# Patient Record
Sex: FEMALE | Race: WHITE | NOT HISPANIC OR LATINO | Employment: FULL TIME | URBAN - METROPOLITAN AREA
[De-identification: names, ages, dates, MRNs, and addresses within clinical notes are randomized per-mention and may not be internally consistent; named-entity substitution may affect disease eponyms.]

---

## 2017-01-12 PROBLEM — E11.8 TYPE 2 DIABETES MELLITUS WITH COMPLICATION, WITHOUT LONG-TERM CURRENT USE OF INSULIN: Status: ACTIVE | Noted: 2017-01-12

## 2017-01-23 PROBLEM — D50.9 MICROCYTIC ANEMIA: Status: ACTIVE | Noted: 2017-01-23

## 2017-01-23 PROBLEM — R73.9 HYPERGLYCEMIA: Status: ACTIVE | Noted: 2017-01-23

## 2017-01-23 PROBLEM — Z71.89 ENCOUNTER FOR MEDICATION COUNSELING: Status: ACTIVE | Noted: 2017-01-23

## 2017-04-07 PROBLEM — G60.9 IDIOPATHIC PERIPHERAL NEUROPATHY: Status: ACTIVE | Noted: 2017-04-07

## 2022-07-07 DIAGNOSIS — M79.641 RIGHT HAND PAIN: Primary | ICD-10-CM

## 2022-08-26 ENCOUNTER — HOSPITAL ENCOUNTER (OUTPATIENT)
Dept: RADIOLOGY | Facility: HOSPITAL | Age: 52
Discharge: HOME | End: 2022-08-26
Attending: ORTHOPAEDIC SURGERY
Payer: COMMERCIAL

## 2022-08-26 ENCOUNTER — OFFICE VISIT (OUTPATIENT)
Dept: ORTHOPEDICS | Facility: CLINIC | Age: 52
End: 2022-08-26
Payer: COMMERCIAL

## 2022-08-26 VITALS — WEIGHT: 192 LBS | HEIGHT: 71 IN | BODY MASS INDEX: 26.88 KG/M2 | RESPIRATION RATE: 18 BRPM

## 2022-08-26 DIAGNOSIS — M65.341 TRIGGER RING FINGER OF RIGHT HAND: Primary | ICD-10-CM

## 2022-08-26 DIAGNOSIS — M79.641 RIGHT HAND PAIN: ICD-10-CM

## 2022-08-26 DIAGNOSIS — M79.641 RIGHT HAND PAIN: Primary | ICD-10-CM

## 2022-08-26 DIAGNOSIS — M65.351 TRIGGER LITTLE FINGER OF RIGHT HAND: ICD-10-CM

## 2022-08-26 PROCEDURE — 20550 NJX 1 TENDON SHEATH/LIGAMENT: CPT | Mod: F9,59,S$GLB,ICN | Performed by: ORTHOPAEDIC SURGERY

## 2022-08-26 PROCEDURE — 99204 PR OFFICE/OUTPT VISIT, NEW, LEVL IV, 45-59 MIN: ICD-10-PCS | Mod: 25,S$GLB,ICN, | Performed by: ORTHOPAEDIC SURGERY

## 2022-08-26 PROCEDURE — 3008F PR BODY MASS INDEX (BMI) DOCUMENTED: ICD-10-PCS | Mod: S$GLB,ICN,, | Performed by: ORTHOPAEDIC SURGERY

## 2022-08-26 PROCEDURE — 73130 XR HAND COMPLETE 3 VIEW RIGHT: ICD-10-PCS | Mod: 26,RT,, | Performed by: RADIOLOGY

## 2022-08-26 PROCEDURE — 99204 OFFICE O/P NEW MOD 45 MIN: CPT | Mod: 25,S$GLB,ICN, | Performed by: ORTHOPAEDIC SURGERY

## 2022-08-26 PROCEDURE — 3008F BODY MASS INDEX DOCD: CPT | Mod: S$GLB,ICN,, | Performed by: ORTHOPAEDIC SURGERY

## 2022-08-26 PROCEDURE — 99999 PR PBB SHADOW E&M-EST. PATIENT-LVL II: ICD-10-PCS | Mod: PBBFAC,,, | Performed by: ORTHOPAEDIC SURGERY

## 2022-08-26 PROCEDURE — 73130 X-RAY EXAM OF HAND: CPT | Mod: 26,RT,, | Performed by: RADIOLOGY

## 2022-08-26 PROCEDURE — 20550 PR INJECT TENDON SHEATH/LIGAMENT: ICD-10-PCS | Mod: F9,59,S$GLB,ICN | Performed by: ORTHOPAEDIC SURGERY

## 2022-08-26 PROCEDURE — 73130 X-RAY EXAM OF HAND: CPT | Mod: TC,PN,RT

## 2022-08-26 PROCEDURE — 99999 PR PBB SHADOW E&M-EST. PATIENT-LVL II: CPT | Mod: PBBFAC,,, | Performed by: ORTHOPAEDIC SURGERY

## 2022-08-26 PROCEDURE — 1159F PR MEDICATION LIST DOCUMENTED IN MEDICAL RECORD: ICD-10-PCS | Mod: S$GLB,ICN,, | Performed by: ORTHOPAEDIC SURGERY

## 2022-08-26 PROCEDURE — 1159F MED LIST DOCD IN RCRD: CPT | Mod: S$GLB,ICN,, | Performed by: ORTHOPAEDIC SURGERY

## 2022-08-26 PROCEDURE — 20550 NJX 1 TENDON SHEATH/LIGAMENT: CPT | Mod: F7,S$GLB,ICN, | Performed by: ORTHOPAEDIC SURGERY

## 2022-08-26 RX ORDER — IBUPROFEN 800 MG/1
800 TABLET ORAL 3 TIMES DAILY
COMMUNITY
Start: 2022-03-26

## 2022-08-26 NOTE — PROGRESS NOTES
"  Subjective:      Patient ID: Neyda Conner is a 52 y.o. female.    Chief Complaint: Pain, Swelling, and Other of the Right Hand    Referring Provider: Aaarefjj Self  No address on file    HPI:   Ms. Conner is a 52-year-old right-hand-dominant lady who presented today for evaluation of approximately 2 months of right hand pain with primarily her ring and small finger affected.  She says she has pain with burning in her fingers.  Her pain is primarily at the MCP but does radiate upper fingers.  Intermittently her   Ring finger will get stuck.  Gripping things increases her symptoms while rest improves them.  She denied numbness or tingling in her fingers.  She has taken NSAIDs with help.  She has worn a brace which did not help.  She has not done occupational therapy nor had injections.    Past Medical History:   Diagnosis Date    Diabetes mellitus, type 2      *  *  * Gestational diabetes  Hypertension   Seasonal allergies   Headaches      Past Surgical History:   Procedure Laterality Date    BARIATRIC SURGERY  2014   *  BILATERAL BREAST AUGMENTATION    STOMACH SURGERY  07/09/2015    Gastric Sleeve       Review of patient's allergies indicates:   Allergen Reactions    Bactrim [sulfamethoxazole-trimethoprim] Nausea And Vomiting    Lortab [hydrocodone-acetaminophen]      "made my blood pressure go up"       Social History     Occupational History     [employer]   Tobacco Use    Smoking status: Never Smoker    Smokeless tobacco: Not on file   Substance and Sexual Activity    Alcohol use: No    Drug use: No    Sexual activity: Not on file      Family History   Adopted: Yes   Father: Alive, denied medical problems.    Mother: Alive, denied medical problems.    Brother:  2, alive, denied medical problems.    Sister:  2, alive, denied medical problems.    Son:  2, alive, heart surgery/ autism.    Daughter:  1, alive, denied medical problems.       Previous Hospitalizations:   Childbirth.     ROS:   Review of Systems "   Constitutional: Negative for chills and fever.   HENT: Negative for congestion.    Eyes: Negative for blurred vision and double vision.   Cardiovascular: Negative for chest pain and cyanosis.   Respiratory: Negative for cough and shortness of breath.    Endocrine: Negative for cold intolerance and polydipsia.   Hematologic/Lymphatic: Negative for adenopathy.   Skin: Negative for flushing, itching and skin cancer.   Musculoskeletal: Negative for falls and gout.   Gastrointestinal: Negative for constipation, diarrhea and heartburn.   Genitourinary: Negative for nocturia.   Neurological: Positive for headaches. Negative for seizures.   Psychiatric/Behavioral: Negative for depression and substance abuse. The patient is not nervous/anxious.    Allergic/Immunologic: Positive for environmental allergies.           Objective:      Physical Exam:   General: AAOx3.  No acute distress  HEENT: Normocephalic, PEARLA EOMI .  Fair Dentition  Neck: Supple, No JVD  Chest: Symetric, equal excursion on inspiration  Abdomen: Soft NTND  Vascular:  Pulses intact and equal bilaterally.  Capillary refill less than 3 seconds and equal bilaterally  Neurologic:  Pinprick and soft touch intact and equal bilaterally  Integment:  No ecchymosis, no errythema  Extremity:  Wrist/hand:  Pronation /supination equal bilaterally 90/85 degrees.  Dorsiflexion / volar flexion equal bilaterally 75/70 degrees.  Nontender in the anatomic snuffbox bilaterally.  Nontender at the 1st dorsal compartment bilaterally.  No swelling at the 1st dorsal compartment bilaterally.  Finkelstein's negative bilaterally.  Grind test negative bilaterally.  Nontender at the scapholunate interval bilaterally.  Bey's test negative bilaterally.  Nontender at the DRUJ / TFCC bilaterally.  Ulnar impaction test negative bilaterally.  Bey's test negative bilaterally.  Tinel's negative both wrist.  Durkan's test negative both wrist.  Volar nodules palpable right ring and small  finger at MCP.  Tender with palpation volar nodules right ring and small fingers. Mild triggering right ring finger tender with motion/ flexion right ring and small fingers.  Radiography:  Personally reviewed   X-rays of the right hand completed on 08/26/2022 showed no fracture dislocation.      Assessment:       Impression:      1. Trigger ring finger of right hand    2. Trigger little finger of right hand    3. Right hand pain          Plan:       1.  Discussed physical examination and radiographic findings with the patient. Neyda understands that she has  Trigger fingers of her right ring and small fingers.  Treatment alternatives and outcomes were discussed with the patient she understands she could be treated conservatively with observation, activity modification, NSAIDs, bracing, physical therapy, injections, or she could consider surgical intervention such as A1 pulley release.  Recommend she trial conservative management a little bit longer and if she fails further conservative care then consider surgical intervention.    2.  Offered a steroid injection to the flexor tendon sheath of the ring and small fingers she elected to proceed.    3.  IP taping was shown to the patient.  4. Recommend the patient purchase vitamin-E and applied to the volar base of her ring and small fingers and massage it in twice daily to help soften the A1 pulley to decrease triggering.      5.  Recommend the patient purchase over-the-counter Voltaren gel and applied to her hand twice daily and massage in for 2 minutes.    6.  Take NSAIDs as tolerated allowed by PCM.    7.  Follow up p.r.n..

## 2022-08-26 NOTE — PROCEDURES
Tendon Sheath    Date/Time: 8/26/2022 2:00 PM  Performed by: Dwaine Landa DO  Authorized by: Dwaine Landa, DO     Consent Done?:  Yes (Verbal)  Indications:  Pain and diagnostic evaluation  Site marked: the procedure site was marked    Timeout: prior to procedure the correct patient, procedure, and site was verified    Prep: patient was prepped and draped in usual sterile fashion      Location:  Small finger  Site:  R small flexor tendon sheath  Ultrasonic guidance for needle placement?: No    Needle size:  25 G  Approach:  Volar  Medications:  10 mg triamcinolone acetonide 10 mg/mL  Patient tolerance:  Patient tolerated the procedure well with no immediate complications

## 2022-08-26 NOTE — PROCEDURES
Tendon Sheath    Date/Time: 8/26/2022 2:00 PM  Performed by: Dwaine Landa DO  Authorized by: Dwaine Landa, DO     Consent Done?:  Yes (Verbal)  Indications:  Pain and diagnostic evaluation  Site marked: the procedure site was marked    Timeout: prior to procedure the correct patient, procedure, and site was verified    Prep: patient was prepped and draped in usual sterile fashion      Location:  Ring finger  Site:  R ring flexor tendon sheath  Ultrasonic guidance for needle placement?: No    Needle size:  25 G  Approach:  Volar  Medications:  10 mg triamcinolone acetonide 10 mg/mL  Patient tolerance:  Patient tolerated the procedure well with no immediate complications

## 2023-03-30 DIAGNOSIS — M79.641 RIGHT HAND PAIN: Primary | ICD-10-CM

## 2023-04-05 ENCOUNTER — OFFICE VISIT (OUTPATIENT)
Dept: ORTHOPEDICS | Facility: CLINIC | Age: 53
End: 2023-04-05
Payer: COMMERCIAL

## 2023-04-05 ENCOUNTER — HOSPITAL ENCOUNTER (OUTPATIENT)
Dept: RADIOLOGY | Facility: HOSPITAL | Age: 53
Discharge: HOME | End: 2023-04-05
Attending: ORTHOPAEDIC SURGERY
Payer: COMMERCIAL

## 2023-04-05 VITALS — RESPIRATION RATE: 15 BRPM | WEIGHT: 192 LBS | HEIGHT: 71 IN | BODY MASS INDEX: 26.88 KG/M2

## 2023-04-05 DIAGNOSIS — Z01.818 PRE-OP TESTING: ICD-10-CM

## 2023-04-05 DIAGNOSIS — M79.641 RIGHT HAND PAIN: ICD-10-CM

## 2023-04-05 DIAGNOSIS — M65.351 TRIGGER LITTLE FINGER OF RIGHT HAND: ICD-10-CM

## 2023-04-05 DIAGNOSIS — M65.341 TRIGGER RING FINGER OF RIGHT HAND: Primary | ICD-10-CM

## 2023-04-05 PROCEDURE — 99214 OFFICE O/P EST MOD 30 MIN: CPT | Mod: S$GLB,,, | Performed by: ORTHOPAEDIC SURGERY

## 2023-04-05 PROCEDURE — 73130 X-RAY EXAM OF HAND: CPT | Mod: 26,RT,, | Performed by: RADIOLOGY

## 2023-04-05 PROCEDURE — 99999 PR PBB SHADOW E&M-EST. PATIENT-LVL III: CPT | Mod: PBBFAC,,, | Performed by: ORTHOPAEDIC SURGERY

## 2023-04-05 PROCEDURE — 73130 XR HAND COMPLETE 3 VIEW RIGHT: ICD-10-PCS | Mod: 26,RT,, | Performed by: RADIOLOGY

## 2023-04-05 PROCEDURE — 99999 PR PBB SHADOW E&M-EST. PATIENT-LVL III: ICD-10-PCS | Mod: PBBFAC,,, | Performed by: ORTHOPAEDIC SURGERY

## 2023-04-05 PROCEDURE — 99214 PR OFFICE/OUTPT VISIT, EST, LEVL IV, 30-39 MIN: ICD-10-PCS | Mod: S$GLB,,, | Performed by: ORTHOPAEDIC SURGERY

## 2023-04-05 PROCEDURE — 1159F MED LIST DOCD IN RCRD: CPT | Mod: S$GLB,,, | Performed by: ORTHOPAEDIC SURGERY

## 2023-04-05 PROCEDURE — 3008F BODY MASS INDEX DOCD: CPT | Mod: S$GLB,,, | Performed by: ORTHOPAEDIC SURGERY

## 2023-04-05 PROCEDURE — 1159F PR MEDICATION LIST DOCUMENTED IN MEDICAL RECORD: ICD-10-PCS | Mod: S$GLB,,, | Performed by: ORTHOPAEDIC SURGERY

## 2023-04-05 PROCEDURE — 3008F PR BODY MASS INDEX (BMI) DOCUMENTED: ICD-10-PCS | Mod: S$GLB,,, | Performed by: ORTHOPAEDIC SURGERY

## 2023-04-05 PROCEDURE — 73130 X-RAY EXAM OF HAND: CPT | Mod: TC,RT

## 2023-04-05 NOTE — H&P
"Chief Complaint: Pain, Swelling, and Other of the Right Hand     HPI:   Ms. Conner is a 52-year-old right-hand-dominant lady who presented for re-evaluation of her right hand.  At her last visit on 08/26/2022 she was given a steroid injection to trigger fingers of the ring and small fingers of her right hand which gave her a proximally 1 month of relief of pain.  She stated the pain has recurred to the point she is having trouble doing her job where she has to lift and carry items.  Flexing her fingers increases her pain while nothing seems to improve them.  She has been doing IP taping which helps.  Her fingers lock up and in the morning she has to push them to open them up.  She was originally seen on 08/26/2022 for 2 months of right hand pain with primarily her ring and small finger affected. She denied numbness or tingling in her fingers.  She has taken NSAIDs with help.            Past Medical History:   Diagnosis Date    Diabetes mellitus, type 2       *  *  * Gestational diabetes  Hypertension   Seasonal allergies   Headaches              Past Surgical History:   Procedure Laterality Date    BARIATRIC SURGERY   2014   *  BILATERAL BREAST AUGMENTATION    STOMACH SURGERY   07/09/2015     Gastric Sleeve               Review of patient's allergies indicates:   Allergen Reactions    Bactrim [sulfamethoxazole-trimethoprim] Nausea And Vomiting    Lortab [hydrocodone-acetaminophen]         "made my blood pressure go up"         Social History           Occupational History     [employer]   Tobacco Use    Smoking status: Never Smoker    Smokeless tobacco: Not on file   Substance and Sexual Activity    Alcohol use: No    Drug use: No    Sexual activity: Not on file      Family History   Adopted: Yes   Father: Alive, denied medical problems.    Mother: Alive, denied medical problems.    Brother:  2, alive, denied medical problems.    Sister:  2, alive, denied medical problems.    Son:  2, alive, heart surgery/ autism.  "   Daughter:  1, alive, denied medical problems.         Previous Hospitalizations:   Childbirth.      ROS:  No new diagnosis/ surgery/prescriptions since last office visit on 08/26/2022.  Constitutional: Negative for chills and fever.   HENT: Negative for congestion.    Eyes: Negative for blurred vision and double vision.   Cardiovascular: Negative for chest pain and cyanosis.   Respiratory: Negative for cough and shortness of breath.    Endocrine: Negative for cold intolerance and polydipsia.   Hematologic/Lymphatic: Negative for adenopathy.   Skin: Negative for flushing, itching and skin cancer.   Musculoskeletal: Negative for falls and gout.   Gastrointestinal: Negative for constipation, diarrhea and heartburn.   Genitourinary: Negative for nocturia.   Neurological: Positive for headaches. Negative for seizures.   Psychiatric/Behavioral: Negative for depression and substance abuse. The patient is not nervous/anxious.    Allergic/Immunologic: Positive for environmental allergies.             Objective:   Physical Exam:   General: AAOx3.  No acute distress  HEENT: Normocephalic, PEARLA EOMI .  Fair Dentition  Neck: Supple, No JVD  Chest: Symetric, equal excursion on inspiration  Abdomen: Soft NTND  Vascular:  Pulses intact and equal bilaterally.  Capillary refill less than 3 seconds and equal bilaterally  Neurologic:  Pinprick and soft touch intact and equal bilaterally  Integment:  No ecchymosis, no errythema  Extremity:  Wrist/hand:  Pronation /supination equal bilaterally 90/85 degrees.  Dorsiflexion / volar flexion equal bilaterally 75/70 degrees.  Nontender in the anatomic snuffbox bilaterally.  Nontender at the 1st dorsal compartment bilaterally.  No swelling at the 1st dorsal compartment bilaterally.  Finkelstein's negative bilaterally.  Grind test negative bilaterally.  Nontender at the scapholunate interval bilaterally.  Bey's test negative bilaterally.  Nontender at the DRUJ / TFCC bilaterally.  Ulnar  impaction test negative bilaterally.  Bey's test negative bilaterally.  Tinel's negative both wrist.  Durkan's test negative both wrist.  Volar nodules palpable right ring and small finger at MCP.  Tender with palpation volar nodules right ring and small fingers. Mild triggering right ring finger tender with motion/ flexion right ring and small fingers.  Radiography:  Personally reviewed x-rays of the right hand completed on 04/05/2020 showed no fracture dislocation.      Assessment:       Impression:       1. Trigger ring finger of right hand    2. Trigger little finger of right hand    3. Right hand pain           Plan:       1.  Discussed physical examination and radiographic findings with the patient. Neyda understands that she has trigger fingers of her right ring and small fingers.  Treatment alternatives and outcomes were discussed with the patient she understands she could continue to be treated conservatively with observation, activity modification, NSAIDs, bracing, physical therapy, injections, or she could consider surgical intervention such as A1 pulley release. She stated that she needs to be able use her hand and she is tired of having her fingers lock she would like to proceed with surgery.  2. Possible complications of surgery to include bleeding, infection, scarring, nerve/ blood vessel /tendon damage, need for further surgery, failed surgery, failure to improve, possible persistent pain, and possible recurrence were discussed with the patient.  The patient was permitted to ask questions and all concerns were addressed to her satisfaction.  3. Consent for A1 pulley release right ring and small fingers.    4.  Tentatively schedule surgery for 05/01/2023.  5. Ultram 50 mg, 1 p.o. Q 6 hours p.r.n. pain, dispense 30, refill 0, prescription was given to the patient to have filled prior to surgery she understands this is for postop use only.    6. Continue with NSAIDs as tolerated allowed by PCM.     7. Continue with IP taping as previously shown discussed.  8.  A letter was given to the patient to give to her place of employment stating that she is to have surgery on 05/01/2023 and if they could accommodate her with convalescent leave until her 1st postop visit 10-12 days after surgery.    9.Follow up approximately 10-12 days postoperatively

## 2023-04-05 NOTE — LETTER
April 5, 2023      [facility]  [address] [place] [address]  Phone: [phone]  Fax: [phone]       Patient: Nyeda Conner   YOB: 1970  Date of Visit: 04/05/2023    To Whom It May Concern:    Cammy Conner  was at [facility] on 04/05/2023. The patient may return to work on 4/5/23 with no restrictions. Mrs. Conner is scheduled for surgery on 5/1/23. She should be out of work until her first post-operative appointment on 5/12/23. At that time she may be released with restrictions. If you have any questions or concerns, or if I can be of further assistance, please do not hesitate to contact me.    Sincerely,        Anat Hunt LPN

## 2023-04-05 NOTE — PROGRESS NOTES
"Patient ID: Neyda Conner is a 52 y.o. female.     Chief Complaint: Pain, Swelling, and Other of the Right Hand     Referring Provider: Tameka Self  No address on file     HPI:   Ms. Conner is a 52-year-old right-hand-dominant lady who presented for re-evaluation of her right hand.  At her last visit on 08/26/2022 she was given a steroid injection to trigger fingers of the ring and small fingers of her right hand which gave her a proximally 1 month of relief of pain.  She stated the pain has recurred to the point she is having trouble doing her job where she has to lift and carry items.  Flexing her fingers increases her pain while nothing seems to improve them.  She has been doing IP taping which helps.  Her fingers lock up and in the morning she has to push them to open them up.  She was originally seen on 08/26/2022 for 2 months of right hand pain with primarily her ring and small finger affected. She denied numbness or tingling in her fingers.  She has taken NSAIDs with help.            Past Medical History:   Diagnosis Date    Diabetes mellitus, type 2       *  *  * Gestational diabetes  Hypertension   Seasonal allergies   Headaches              Past Surgical History:   Procedure Laterality Date    BARIATRIC SURGERY   2014   *  BILATERAL BREAST AUGMENTATION    STOMACH SURGERY   07/09/2015     Gastric Sleeve               Review of patient's allergies indicates:   Allergen Reactions    Bactrim [sulfamethoxazole-trimethoprim] Nausea And Vomiting    Lortab [hydrocodone-acetaminophen]         "made my blood pressure go up"         Social History           Occupational History     [employer]   Tobacco Use    Smoking status: Never Smoker    Smokeless tobacco: Not on file   Substance and Sexual Activity    Alcohol use: No    Drug use: No    Sexual activity: Not on file      Family History   Adopted: Yes   Father: Alive, denied medical problems.    Mother: Alive, denied medical problems.    Brother:  2, alive, denied " medical problems.    Sister:  2, alive, denied medical problems.    Son:  2, alive, heart surgery/ autism.    Daughter:  1, alive, denied medical problems.         Previous Hospitalizations:   Childbirth.      ROS:  No new diagnosis/ surgery/prescriptions since last office visit on 08/26/2022.  Constitutional: Negative for chills and fever.   HENT: Negative for congestion.    Eyes: Negative for blurred vision and double vision.   Cardiovascular: Negative for chest pain and cyanosis.   Respiratory: Negative for cough and shortness of breath.    Endocrine: Negative for cold intolerance and polydipsia.   Hematologic/Lymphatic: Negative for adenopathy.   Skin: Negative for flushing, itching and skin cancer.   Musculoskeletal: Negative for falls and gout.   Gastrointestinal: Negative for constipation, diarrhea and heartburn.   Genitourinary: Negative for nocturia.   Neurological: Positive for headaches. Negative for seizures.   Psychiatric/Behavioral: Negative for depression and substance abuse. The patient is not nervous/anxious.    Allergic/Immunologic: Positive for environmental allergies.             Objective:   Physical Exam:   General: AAOx3.  No acute distress  HEENT: Normocephalic, PEARLA EOMI .  Fair Dentition  Neck: Supple, No JVD  Chest: Symetric, equal excursion on inspiration  Abdomen: Soft NTND  Vascular:  Pulses intact and equal bilaterally.  Capillary refill less than 3 seconds and equal bilaterally  Neurologic:  Pinprick and soft touch intact and equal bilaterally  Integment:  No ecchymosis, no errythema  Extremity:  Wrist/hand:  Pronation /supination equal bilaterally 90/85 degrees.  Dorsiflexion / volar flexion equal bilaterally 75/70 degrees.  Nontender in the anatomic snuffbox bilaterally.  Nontender at the 1st dorsal compartment bilaterally.  No swelling at the 1st dorsal compartment bilaterally.  Finkelstein's negative bilaterally.  Grind test negative bilaterally.  Nontender at the scapholunate  interval bilaterally.  Bey's test negative bilaterally.  Nontender at the DRUJ / TFCC bilaterally.  Ulnar impaction test negative bilaterally.  Bey's test negative bilaterally.  Tinel's negative both wrist.  Durkan's test negative both wrist.  Volar nodules palpable right ring and small finger at MCP.  Tender with palpation volar nodules right ring and small fingers. Mild triggering right ring finger tender with motion/ flexion right ring and small fingers.  Radiography:  Personally reviewed x-rays of the right hand completed on 04/05/2020 showed no fracture dislocation.      Assessment:       Impression:       1. Trigger ring finger of right hand    2. Trigger little finger of right hand    3. Right hand pain           Plan:       1.  Discussed physical examination and radiographic findings with the patient. Neyda understands that she has trigger fingers of her right ring and small fingers.  Treatment alternatives and outcomes were discussed with the patient she understands she could continue to be treated conservatively with observation, activity modification, NSAIDs, bracing, physical therapy, injections, or she could consider surgical intervention such as A1 pulley release. She stated that she needs to be able use her hand and she is tired of having her fingers lock she would like to proceed with surgery.  2. Possible complications of surgery to include bleeding, infection, scarring, nerve/ blood vessel /tendon damage, need for further surgery, failed surgery, failure to improve, possible persistent pain, and possible recurrence were discussed with the patient.  The patient was permitted to ask questions and all concerns were addressed to her satisfaction.  3. Consent for A1 pulley release right ring and small fingers.    4.  Tentatively schedule surgery for 05/01/2023.  5. Ultram 50 mg, 1 p.o. Q 6 hours p.r.n. pain, dispense 30, refill 0, prescription was given to the patient to have filled prior to  surgery she understands this is for postop use only.    6. Continue with NSAIDs as tolerated allowed by PCM.    7. Continue with IP taping as previously shown discussed.  8.  A letter was given to the patient to give to her place of employment stating that she is to have surgery on 05/01/2023 and if they could accommodate her with convalescent leave until her 1st postop visit 10-12 days after surgery.    9.Follow up approximately 10-12 days postoperatively

## 2023-04-24 ENCOUNTER — HOSPITAL ENCOUNTER (OUTPATIENT)
Dept: PREADMISSION TESTING | Facility: HOSPITAL | Age: 53
Discharge: HOME | End: 2023-04-24
Attending: ORTHOPAEDIC SURGERY
Payer: COMMERCIAL

## 2023-04-24 ENCOUNTER — ANESTHESIA EVENT (OUTPATIENT)
Dept: SURGERY | Facility: HOSPITAL | Age: 53
End: 2023-04-24
Payer: COMMERCIAL

## 2023-04-24 PROCEDURE — 99900103 DSU ONLY-NO CHARGE-INITIAL HR (STAT)

## 2023-04-24 NOTE — ANESTHESIA PREPROCEDURE EVALUATION
04/24/2023  Neyda Conner is a 52 y.o., female.      Pre-op Assessment    I have reviewed the Patient Summary Reports.     I have reviewed the Nursing Notes. I have reviewed the NPO Status.   I have reviewed the Medications.     Review of Systems  Social:  Non-Smoker    EENT/Dental:EENT/Dental Normal   Cardiovascular:  Cardiovascular Normal     Pulmonary:  Pulmonary Normal    Renal/:  Renal/ Normal     Hepatic/GI:  Hepatic/GI Normal    Neurological:   Peripheral Neuropathy    Endocrine:   Diabetes        Physical Exam    Airway:  Mallampati: II   Mouth Opening: fused/wired  TM Distance: 4 - 6 cm  Tongue: Normal  Neck ROM: Normal ROM    Chest/Lungs:  Clear to auscultation    Heart:  Rate: Normal  Rhythm: Regular Rhythm        Anesthesia Plan  Type of Anesthesia, risks & benefits discussed:    Anesthesia Type: Gen Supraglottic Airway, Gen ETT  Intra-op Monitoring Plan: Standard ASA Monitors  Post Op Pain Control Plan: multimodal analgesia  Induction:  IV  Informed Consent: Informed consent signed with the Patient and all parties understand the risks and agree with anesthesia plan.  All questions answered.   ASA Score: 3  Day of Surgery Review of History & Physical: H&P Update referred to the surgeon/provider.    Ready For Surgery From Anesthesia Perspective.     .

## 2023-05-01 ENCOUNTER — TELEPHONE (OUTPATIENT)
Dept: ORTHOPEDICS | Facility: CLINIC | Age: 53
End: 2023-05-01
Payer: COMMERCIAL

## 2023-05-01 ENCOUNTER — ANESTHESIA (OUTPATIENT)
Dept: SURGERY | Facility: HOSPITAL | Age: 53
End: 2023-05-01
Payer: COMMERCIAL

## 2023-05-01 ENCOUNTER — HOSPITAL ENCOUNTER (OUTPATIENT)
Facility: HOSPITAL | Age: 53
Discharge: HOME | End: 2023-05-01
Attending: ORTHOPAEDIC SURGERY | Admitting: ORTHOPAEDIC SURGERY
Payer: COMMERCIAL

## 2023-05-01 VITALS
DIASTOLIC BLOOD PRESSURE: 91 MMHG | RESPIRATION RATE: 11 BRPM | OXYGEN SATURATION: 95 % | HEART RATE: 66 BPM | TEMPERATURE: 98 F | BODY MASS INDEX: 26.88 KG/M2 | SYSTOLIC BLOOD PRESSURE: 166 MMHG | WEIGHT: 192 LBS | HEIGHT: 71 IN

## 2023-05-01 DIAGNOSIS — M65.341 TRIGGER RING FINGER OF RIGHT HAND: Primary | ICD-10-CM

## 2023-05-01 DIAGNOSIS — M65.351 TRIGGER LITTLE FINGER OF RIGHT HAND: ICD-10-CM

## 2023-05-01 LAB — POCT GLUCOSE: 130 MG/DL (ref 70–110)

## 2023-05-01 PROCEDURE — 25000003 PHARM REV CODE 250: Performed by: ORTHOPAEDIC SURGERY

## 2023-05-01 PROCEDURE — 26055 INCISE FINGER TENDON SHEATH: CPT | Mod: F8,,, | Performed by: ORTHOPAEDIC SURGERY

## 2023-05-01 PROCEDURE — 26055 PR INCISE FINGER TENDON SHEATH: ICD-10-PCS | Mod: F8,,, | Performed by: ORTHOPAEDIC SURGERY

## 2023-05-01 PROCEDURE — D9220A PRA ANESTHESIA: ICD-10-PCS | Mod: ANES,,, | Performed by: ANESTHESIOLOGY

## 2023-05-01 PROCEDURE — 71000015 HC POSTOP RECOV 1ST HR: Performed by: ORTHOPAEDIC SURGERY

## 2023-05-01 PROCEDURE — D9220A PRA ANESTHESIA: Mod: ANES,,, | Performed by: ANESTHESIOLOGY

## 2023-05-01 PROCEDURE — 63600175 PHARM REV CODE 636 W HCPCS: Performed by: ANESTHESIOLOGY

## 2023-05-01 PROCEDURE — 63600175 PHARM REV CODE 636 W HCPCS: Performed by: NURSE ANESTHETIST, CERTIFIED REGISTERED

## 2023-05-01 PROCEDURE — 71000033 HC RECOVERY, INTIAL HOUR: Performed by: ORTHOPAEDIC SURGERY

## 2023-05-01 PROCEDURE — 37000009 HC ANESTHESIA EA ADD 15 MINS: Performed by: ORTHOPAEDIC SURGERY

## 2023-05-01 PROCEDURE — 25000003 PHARM REV CODE 250: Performed by: NURSE ANESTHETIST, CERTIFIED REGISTERED

## 2023-05-01 PROCEDURE — 63600175 PHARM REV CODE 636 W HCPCS: Performed by: ORTHOPAEDIC SURGERY

## 2023-05-01 PROCEDURE — 36000706: Performed by: ORTHOPAEDIC SURGERY

## 2023-05-01 PROCEDURE — D9220A PRA ANESTHESIA: Mod: CRNA,,, | Performed by: NURSE ANESTHETIST, CERTIFIED REGISTERED

## 2023-05-01 PROCEDURE — 36000707: Performed by: ORTHOPAEDIC SURGERY

## 2023-05-01 PROCEDURE — 37000008 HC ANESTHESIA 1ST 15 MINUTES: Performed by: ORTHOPAEDIC SURGERY

## 2023-05-01 PROCEDURE — D9220A PRA ANESTHESIA: ICD-10-PCS | Mod: CRNA,,, | Performed by: NURSE ANESTHETIST, CERTIFIED REGISTERED

## 2023-05-01 RX ORDER — CEFAZOLIN SODIUM 2 G/50ML
2 SOLUTION INTRAVENOUS ONCE
Status: COMPLETED | OUTPATIENT
Start: 2023-05-01 | End: 2023-05-01

## 2023-05-01 RX ORDER — FENTANYL CITRATE 50 UG/ML
INJECTION, SOLUTION INTRAMUSCULAR; INTRAVENOUS
Status: DISCONTINUED | OUTPATIENT
Start: 2023-05-01 | End: 2023-05-01

## 2023-05-01 RX ORDER — MIDAZOLAM HYDROCHLORIDE 1 MG/ML
INJECTION INTRAMUSCULAR; INTRAVENOUS
Status: DISCONTINUED | OUTPATIENT
Start: 2023-05-01 | End: 2023-05-01

## 2023-05-01 RX ORDER — DIPHENHYDRAMINE HYDROCHLORIDE 50 MG/ML
INJECTION INTRAMUSCULAR; INTRAVENOUS
Status: DISCONTINUED | OUTPATIENT
Start: 2023-05-01 | End: 2023-05-01

## 2023-05-01 RX ORDER — ONDANSETRON 2 MG/ML
INJECTION INTRAMUSCULAR; INTRAVENOUS
Status: DISCONTINUED | OUTPATIENT
Start: 2023-05-01 | End: 2023-05-01

## 2023-05-01 RX ORDER — ONDANSETRON 2 MG/ML
4 INJECTION INTRAMUSCULAR; INTRAVENOUS DAILY PRN
Status: DISCONTINUED | OUTPATIENT
Start: 2023-05-01 | End: 2023-05-01 | Stop reason: HOSPADM

## 2023-05-01 RX ORDER — LIDOCAINE HYDROCHLORIDE 10 MG/ML
1 INJECTION, SOLUTION EPIDURAL; INFILTRATION; INTRACAUDAL; PERINEURAL ONCE
Status: DISCONTINUED | OUTPATIENT
Start: 2023-05-01 | End: 2023-05-01 | Stop reason: HOSPADM

## 2023-05-01 RX ORDER — PROPOFOL 10 MG/ML
VIAL (ML) INTRAVENOUS
Status: DISCONTINUED | OUTPATIENT
Start: 2023-05-01 | End: 2023-05-01

## 2023-05-01 RX ORDER — LIDOCAINE HYDROCHLORIDE 20 MG/ML
INJECTION, SOLUTION EPIDURAL; INFILTRATION; INTRACAUDAL; PERINEURAL
Status: DISCONTINUED | OUTPATIENT
Start: 2023-05-01 | End: 2023-05-01

## 2023-05-01 RX ORDER — CEFAZOLIN SODIUM 2 G/50ML
SOLUTION INTRAVENOUS
Status: COMPLETED
Start: 2023-05-01 | End: 2023-05-01

## 2023-05-01 RX ORDER — SODIUM CHLORIDE, SODIUM LACTATE, POTASSIUM CHLORIDE, CALCIUM CHLORIDE 600; 310; 30; 20 MG/100ML; MG/100ML; MG/100ML; MG/100ML
125 INJECTION, SOLUTION INTRAVENOUS CONTINUOUS
Status: DISCONTINUED | OUTPATIENT
Start: 2023-05-01 | End: 2023-05-01 | Stop reason: HOSPADM

## 2023-05-01 RX ORDER — SODIUM CHLORIDE, SODIUM LACTATE, POTASSIUM CHLORIDE, CALCIUM CHLORIDE 600; 310; 30; 20 MG/100ML; MG/100ML; MG/100ML; MG/100ML
INJECTION, SOLUTION INTRAVENOUS CONTINUOUS
Status: DISCONTINUED | OUTPATIENT
Start: 2023-05-01 | End: 2023-05-01 | Stop reason: HOSPADM

## 2023-05-01 RX ORDER — DEXAMETHASONE SODIUM PHOSPHATE 4 MG/ML
INJECTION, SOLUTION INTRA-ARTICULAR; INTRALESIONAL; INTRAMUSCULAR; INTRAVENOUS; SOFT TISSUE
Status: DISCONTINUED | OUTPATIENT
Start: 2023-05-01 | End: 2023-05-01

## 2023-05-01 RX ORDER — BUPIVACAINE HYDROCHLORIDE 5 MG/ML
INJECTION, SOLUTION EPIDURAL; INTRACAUDAL
Status: DISCONTINUED | OUTPATIENT
Start: 2023-05-01 | End: 2023-05-01 | Stop reason: HOSPADM

## 2023-05-01 RX ADMIN — LIDOCAINE HYDROCHLORIDE 100 MG: 20 INJECTION, SOLUTION EPIDURAL; INFILTRATION; INTRACAUDAL; PERINEURAL at 07:05

## 2023-05-01 RX ADMIN — CEFAZOLIN SODIUM 2 G: 2 SOLUTION INTRAVENOUS at 07:05

## 2023-05-01 RX ADMIN — DEXAMETHASONE SODIUM PHOSPHATE 4 MG: 4 INJECTION, SOLUTION INTRAMUSCULAR; INTRAVENOUS at 07:05

## 2023-05-01 RX ADMIN — FENTANYL CITRATE 100 MCG: 50 INJECTION, SOLUTION INTRAMUSCULAR; INTRAVENOUS at 07:05

## 2023-05-01 RX ADMIN — DIPHENHYDRAMINE HYDROCHLORIDE 25 MG: 50 INJECTION INTRAMUSCULAR; INTRAVENOUS at 07:05

## 2023-05-01 RX ADMIN — MIDAZOLAM HYDROCHLORIDE 2 MG: 1 INJECTION, SOLUTION INTRAMUSCULAR; INTRAVENOUS at 07:05

## 2023-05-01 RX ADMIN — PROPOFOL 200 MG: 10 INJECTION, EMULSION INTRAVENOUS at 07:05

## 2023-05-01 RX ADMIN — GLYCOPYRROLATE 0.2 MG: 0.2 INJECTION INTRAMUSCULAR; INTRAVENOUS at 07:05

## 2023-05-01 RX ADMIN — ONDANSETRON 4 MG: 2 INJECTION INTRAMUSCULAR; INTRAVENOUS at 07:05

## 2023-05-01 RX ADMIN — SODIUM CHLORIDE, SODIUM LACTATE, POTASSIUM CHLORIDE, AND CALCIUM CHLORIDE: .6; .31; .03; .02 INJECTION, SOLUTION INTRAVENOUS at 07:05

## 2023-05-01 NOTE — OP NOTE
[facility]  Orthopedic Surgery    5/1/2023    Neyda Conner  [id number]      PREOPERATIVE DIAGNOSIS:   1. Trigger ring finger of right hand [M65.341]  2. Trigger little finger of right hand [M65.351]    POSTOPERATIVE DIAGNOSIS:    1. Trigger finger, right ring finger.    2. Trigger finger, right small finger.      PROCEDURE:  (Please bill these 2 procedures with a 59 modifier as they were 2 separate procedures performed through 2 separate distinct incisions.)  1. A1 pulley release, right ring finger.  2. A1 pulley release, right small finger.    SURGEON: Dwaine Landa D.O.    ASSISTANT: Orton Grinnell, CFA.    ANESTHESIA:  General.    BLOOD LOSS:  Less than 5 cc.    TOURNIQUET:  15 minutes    DRAINS:  None.    PATHOLOGY:  A1 pulley.    COMPLICATION:  None.    INDICATIONS FOR PROCEDURE:   Ms. Conner is a 52-year-old right-hand-dominant lady who has recurrent trigger fingers of the ring and small fingers of her right hand.  She has had steroid injections which gave her a proximally 1 month of relief of pain. 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  Flexing her fingers increases her pain while nothing seems to improve them.  She has been doing IP taping which helps.  Her fingers lock up in the morning.  She has to push them to open them up.  She was originally seen on 08/26/2022 for 2 months of right hand pain with primarily her ring and small finger affected. She denied numbness or tingling in her fingers.  She has taken NSAIDs with help.     She elected to proceed with surgery after she failed conservative management and complications to include bleeding, infection, scarring, nerve/blood vessel/tendon damage, need for further surgery, failed surgery, failure to improve, stiffness, skin slough, and possible recurrence were discussed.  She   signed a consent.    PROCEDURE IN DETAIL:   The patient was brought to the operating room and  transferred to the operating bed where all bony prominences were well padded.  General anesthesia was then administered by the Anesthesiology Department.  After general anesthesia was administered a tourniquet was applied to the upper part of the patient's right upper extremity.  The patient's right arm was then prepped with chlorhexidine solution and draped in the normal sterile fashion.  After prepping and draping bony and soft tissue landmarks were identified and transverse incisions over the A1 pulley were drawn at the small and ring fingers volarly.  The patient's hand was then elevated, exsanguinated, and the tourniquet was inflated.         Sharp incision was made at the right ring finger with a #15 blade followed by dissection to the level of the A1 pulley and flexor tendon sheath while protecting vital structures.  A rent was made in the flexor tendon sheath/A1 pulley with a #15 blade and then the A1 pulley was released proximally and distally with tenotomy scissors.  After the release the patient's finger was put through full motion and no further triggering was noted.  The tendons were brought into the operative field and they were inspected and there was fraying of the tendons.  The incision was then copiously irrigated.         A similar procedure was done on the right small finger.  After irrigation of the incisions the tourniquet was released and full hemostasis was ensured.  The incisions were then closed with nylon suture in a vertical mattress type of fashion.  Her incisions were then dressed with Adaptic, sterile gauze, Kerlix as fluffs followed by an Ace wrap.  She was then awakened by anesthesia and transferred from the operating room to the recovery room in stable condition.  She tolerated the procedures well without complication.

## 2023-05-01 NOTE — DISCHARGE INSTRUCTIONS

## 2023-05-01 NOTE — PLAN OF CARE
Post-op  mg/dl.   Odomzo Counseling- I discussed with the patient the risks of Odomzo including but not limited to nausea, vomiting, diarrhea, constipation, weight loss, changes in the sense of taste, decreased appetite, muscle spasms, and hair loss.  The patient verbalized understanding of the proper use and possible adverse effects of Odomzo.  All of the patient's questions and concerns were addressed.

## 2023-05-01 NOTE — TELEPHONE ENCOUNTER
Returned call. I stated I will be in the [place] office tomorrow from 8 A.M. to 5:00 P.M. The patient stated she would bring the forms to the office tomorrow. I stated it takes our staff 3-5 days to fill out forms. The patient stated understanding.    ----- Message from Vandana Khanna sent at 5/1/2023  3:54 PM CDT -----  Contact: PT  Type:  Needs Medical Advice    Who Called: PT   Would the patient rather a call back or a response via [facility]? CALL   Best Call Back Number: [phone]  Additional Information:  PT HAS [employer] PAPERWORK THAT SHE NEEDS FILLED OUT FOR HER JOB. PLEASE CALL PT TO CONFIRM WHERE SHE NEEDS TO DROP OFF THE PAPERWORK.

## 2023-05-01 NOTE — DISCHARGE SUMMARY
[facility] Surgery  Discharge Note  Short Stay    Procedure(s) (LRB):  A1 Pulley release Right Ring Finger (Right)  A1 Pulley Release Right Small Finger (Right)      OUTCOME: Patient tolerated treatment/procedure well without complication and is now ready for discharge.    DISPOSITION: Home or Self Care    FINAL DIAGNOSIS:   1. Trigger finger, right ring finger.  2. Trigger finger, right small finger.      FOLLOWUP: In clinic    DISCHARGE INSTRUCTIONS:    Discharge Procedure Orders   Diet Adult Regular     Keep surgical extremity elevated     Ice to affected area     Lifting restrictions   Order Comments: One-handed activities with the left hand only no lifting/pushing/pulling/climbing requiring the use of the right hand.  No activities on ladders/scaffolding/stairs.  May use right hand for gentle activities such as dressing and hygiene.     Other restrictions (specify):   Order Comments: 1. Elevate and ice right hand.  2. May remove dressing in 3 days.  May shower after removal of dressing, do not soak in a tub.  Place Band-Aids over incisions after removal of dressing.  3. May move fingers frequently.    4. One-handed activities with the left hand only no lifting/pushing/pulling/climbing requiring the use of the right hand.  No activities on ladders/scaffolding/stairs.  May use right hand for light activities of daily living such as dressing or hygiene.     Notify your health care provider if you experience any of the following:  temperature >100.4      Remove dressing in 72 hours   Order Comments: May remove dressing in 3 days.  May shower after removal of dressing, do not soak in a tub.  Place Band-Aids over incisions after removal of dressing.     Shower on day dressing removed (No bath)   Order Comments: May remove dressing in 3 days.  May shower after removal of dressing, do not soak in a tub.  Place Band-Aids over incisions after removal of dressing.         Clinical Reference Documents Added to Patient  Instructions         Document    HOW TO PREVENT SURGICAL SITE INFECTIONS (ENGLISH)    POSTOPERATIVE PAIN DISCHARGE INSTRUCTIONS (ENGLISH)    TRIGGER FINGER RELEASE DISCHARGE INSTRUCTIONS (ENGLISH)            TIME SPENT ON DISCHARGE: 20 minutes

## 2023-05-01 NOTE — PLAN OF CARE
Has met unit/department guidelines for discharge from each phase of the post procedure continuum. Leaving floor per w/c with RN. AUGUSTINE x3. Resp even and unlabored room air. No distress noted. Tolerating Po fluids without c/o nausea/vomiting. Reports voiding without difficulty. Post-op dsg remains clean, dry and intact. Denies c/o pain or any other bothersome symptoms. All personal belongings returned to pt.

## 2023-05-01 NOTE — TRANSFER OF CARE
"Anesthesia Transfer of Care Note    Patient: Neyda Conner    Procedure(s) Performed: Procedure(s) (LRB):  A1 Pulley release Right Ring Finger (Right)  A1 Pulley Release Right Small Finger (Right)    Patient location: PACU    Anesthesia Type: general    Transport from OR: Transported from OR on room air with adequate spontaneous ventilation    Post pain: adequate analgesia    Post assessment: no apparent anesthetic complications and tolerated procedure well    Post vital signs: stable    Level of consciousness: sedated and responds to stimulation    Nausea/Vomiting: no nausea/vomiting    Complications: none    Transfer of care protocol was followed      Last vitals:   Visit Vitals  BP (!) 162/87   Pulse 60   Temp 36.6 °C (97.8 °F)   Resp 16   Ht 5' 11" (1.803 m)   Wt 87.1 kg (192 lb)   LMP 01/09/2017   SpO2 (!) 94%   BMI 26.78 kg/m²     "

## 2023-05-01 NOTE — ANESTHESIA POSTPROCEDURE EVALUATION
Anesthesia Post Evaluation    Patient: Neyda Conner    Procedure(s) Performed: Procedure(s) (LRB):  A1 Pulley release Right Ring Finger (Right)  A1 Pulley Release Right Small Finger (Right)    Final Anesthesia Type: general      Patient location during evaluation: PACU  Patient participation: Yes- Able to Participate  Level of consciousness: awake and awake and alert  Post-procedure vital signs: reviewed and stable  Pain management: adequate  Airway patency: patent    PONV status at discharge: No PONV  Anesthetic complications: no      Cardiovascular status: blood pressure returned to baseline  Respiratory status: unassisted and spontaneous ventilation  Hydration status: euvolemic  Follow-up not needed.          Vitals Value Taken Time   /91 05/01/23 0920   Temp 36 05/01/23 1250   Pulse 66 05/01/23 0927   Resp 14 05/01/23 0927   SpO2 93 % 05/01/23 0927   Vitals shown include unvalidated device data.      Event Time   Out of Recovery 09:10:00         Pain/Rene Score: Rene Score: 9 (5/1/2023  9:05 AM)  Modified Rene Score: 19 (5/1/2023  9:36 AM)

## 2023-05-01 NOTE — ANESTHESIA PROCEDURE NOTES
Intubation    Date/Time: 5/1/2023 7:43 AM  Performed by: Carolyn Springer CRNA  Authorized by: Chidi Sanchez MD     Intubation:     Induction:  Intravenous    Intubated:  Postinduction    Mask Ventilation:  Easy mask    Attempts:  1    Attempted By:  CRNA    Difficult Airway Encountered?: No      Complications:  None    Airway Device:  Supraglottic airway/LMA    Airway Device Size:  4.0    Style/Cuff Inflation:  Cuffed (inflated to minimal occlusive pressure)    Secured at:  The lips    Placement Verified By:  Capnometry    Complicating Factors:  None    Findings Post-Intubation:  BS equal bilateral and atraumatic/condition of teeth unchanged

## 2023-05-03 LAB — POCT GLUCOSE: 135 MG/DL (ref 70–110)

## 2023-05-04 ENCOUNTER — TELEPHONE (OUTPATIENT)
Dept: ORTHOPEDICS | Facility: CLINIC | Age: 53
End: 2023-05-04
Payer: COMMERCIAL

## 2023-05-04 NOTE — TELEPHONE ENCOUNTER
Returned call. LVM stating I have her FMLA document and I will get it filled out today. I stated the provider will be office tomorrow to sign it. I will send the document after it is signed. I stated for her to call with any questions or concerns.    ----- Message from Amadeo Mai sent at 5/4/2023  8:59 AM CDT -----  Regarding: Return Call  Contact: Patient  Type:  Patient Returning Call    Who Called:Patient  Who Left Message for Patient:office staff please call  Does the patient know what this is regarding?:[place] paper work  Would the patient rather a call back or a response via [facility]? call  Best Call Back Number:[phone]  Additional Information: Please call to advise.

## 2023-05-08 ENCOUNTER — DOCUMENTATION ONLY (OUTPATIENT)
Dept: ORTHOPEDICS | Facility: CLINIC | Age: 53
End: 2023-05-08
Payer: COMMERCIAL

## 2023-05-08 NOTE — PROGRESS NOTES
Haydee's document was successfully faxed to [phone] and e-mailed to [email or web address].     Batched to medical records.

## 2023-05-11 ENCOUNTER — DOCUMENTATION ONLY (OUTPATIENT)
Dept: ORTHOPEDICS | Facility: CLINIC | Age: 53
End: 2023-05-11
Payer: COMMERCIAL

## 2023-05-11 NOTE — PROGRESS NOTES
The patient's [employer] document was successfully faxed to [phone] and e-mailed to [email or web address].    Batched to medical records.

## 2023-05-12 ENCOUNTER — OFFICE VISIT (OUTPATIENT)
Dept: ORTHOPEDICS | Facility: CLINIC | Age: 53
End: 2023-05-12
Payer: COMMERCIAL

## 2023-05-12 VITALS — HEIGHT: 71 IN | RESPIRATION RATE: 16 BRPM | BODY MASS INDEX: 26.88 KG/M2 | WEIGHT: 192 LBS

## 2023-05-12 DIAGNOSIS — Z48.02 VISIT FOR SUTURE REMOVAL: ICD-10-CM

## 2023-05-12 DIAGNOSIS — M65.341 TRIGGER RING FINGER OF RIGHT HAND: Primary | ICD-10-CM

## 2023-05-12 DIAGNOSIS — M65.351 TRIGGER LITTLE FINGER OF RIGHT HAND: ICD-10-CM

## 2023-05-12 PROCEDURE — 99999 PR PBB SHADOW E&M-EST. PATIENT-LVL II: CPT | Mod: PBBFAC,,, | Performed by: ORTHOPAEDIC SURGERY

## 2023-05-12 PROCEDURE — 99024 POSTOP FOLLOW-UP VISIT: CPT | Mod: S$GLB,,, | Performed by: ORTHOPAEDIC SURGERY

## 2023-05-12 PROCEDURE — 99024 PR POST-OP FOLLOW-UP VISIT: ICD-10-PCS | Mod: S$GLB,,, | Performed by: ORTHOPAEDIC SURGERY

## 2023-05-12 PROCEDURE — 3008F BODY MASS INDEX DOCD: CPT | Mod: S$GLB,,, | Performed by: ORTHOPAEDIC SURGERY

## 2023-05-12 PROCEDURE — 3008F PR BODY MASS INDEX (BMI) DOCUMENTED: ICD-10-PCS | Mod: S$GLB,,, | Performed by: ORTHOPAEDIC SURGERY

## 2023-05-12 PROCEDURE — 99999 PR PBB SHADOW E&M-EST. PATIENT-LVL II: ICD-10-PCS | Mod: PBBFAC,,, | Performed by: ORTHOPAEDIC SURGERY

## 2023-05-12 NOTE — LETTER
May 12, 2023      [facility]  [address]  [place] [address]  Phone: [phone]  Fax: [phone]       Patient: Neyda Conner   YOB: 1970  Date of Visit: 05/12/2023    To Whom It May Concern:    Cammy Conner  was at [facility] on 05/12/2023. The patient may return to work  on 5/15/23 with restrictions. Her restrictions are as follows: Work restrictions include one-handed activities with the left hand only. No lifting/pushing/pulling/climbing requiring the use of the right hand.  No activities on ladders/scaffolding/stairs.  Must work in a clean dry environment.  If you have any questions or concerns, or if I can be of further assistance, please do not hesitate to contact me.    Sincerely,          Anat Hunt LPN

## 2023-05-12 NOTE — PROGRESS NOTES
Subjective:      Patient ID: Neyda Conner is a 52 y.o. female.    Chief Complaint: Post-op Evaluation and Pain of the Right Hand      HPI:  Ms. Conner returns today for 1st postop visit after an A1 pulley release of her right ring and small fingers.  She states she is having some pain in her hand.  Her date of surgery 05/01/2023.    ROS:  New diagnosis/surgery/prescriptions since last office visit on 04/05/2023:  A1 pulley release right ring and small fingers.  Constitutional: Negative for chills and fever.   HENT: Negative for congestion.    Eyes: Negative for blurred vision and double vision.   Cardiovascular: Negative for chest pain and cyanosis.   Respiratory: Negative for cough and shortness of breath.    Endocrine: Negative for cold intolerance and polydipsia.   Hematologic/Lymphatic: Negative for adenopathy.   Skin: Negative for flushing, itching and skin cancer.   Musculoskeletal: Negative for falls and gout.   Gastrointestinal: Negative for constipation, diarrhea and heartburn.   Genitourinary: Negative for nocturia.   Neurological: Positive for headaches. Negative for seizures.   Psychiatric/Behavioral: Negative for depression and substance abuse. The patient is not nervous/anxious.    Allergic/Immunologic: Positive for environmental allergies.       Objective:      Physical Exam:   General: AAOx3.  No acute distress  Vascular:  Pulses intact and equal bilaterally.  Capillary refill less than 3 seconds and equal bilaterally  Neurologic:  Pinprick and soft touch intact and equal bilaterally  Integment:  Incisions well approximated with sutures in place.  Extremity:  Wrist/hand: Pronation/supination equal bilaterally.  Dorsiflexion/volar flexion equal bilaterally.  Mild swelling right hand.  Tender with motion right small and ring fingers.  Tender with palpation incision sites right hand.  Active motion intact right small and ring fingers.  Radiography:  No new x-rays done today.      Assessment:        Impression:     1. A1 pulley release right ring finger   2. A1 pulley release right small finger             Plan:       1.  Discussed physical examination with the patient. Neyda understands that she had an A1 pulley release of her right ring and small fingers.  Discussed with the patient that she should be moving her fingers to decrease the pain and the swelling in her hand.  2. Remove sutures and place Steri-Strips.  3. Start doing home exercises such as Thera ball exercises, Thera ball was given to the patient.    4. Refer to occupational therapy to start getting the patient some motion in her fingers.  She understands she needs to be aggressive with the finger motion.    5. Any pain can be treated with over-the-counter medications dosed per box instructions.  6. Work restrictions include one-handed activities with the left hand only no lifting/pushing/pulling/climbing requiring the use of the right hand.  No activities on ladders/scaffolding/stairs.  Must work in a clean dry environment.    7. Follow up in 1 month for re-evaluation

## 2023-05-19 ENCOUNTER — DOCUMENTATION ONLY (OUTPATIENT)
Dept: ORTHOPEDICS | Facility: CLINIC | Age: 53
End: 2023-05-19
Payer: COMMERCIAL

## 2023-05-19 NOTE — PROGRESS NOTES
The patient's [employer] return to work form was filled out. The provider reviewed and signed the document. The document was successfully faxed to [phone] and e-mailed to [email or web address].     Batched to medical records.

## 2023-05-30 ENCOUNTER — PATIENT MESSAGE (OUTPATIENT)
Dept: ORTHOPEDICS | Facility: CLINIC | Age: 53
End: 2023-05-30
Payer: COMMERCIAL

## 2023-05-30 ENCOUNTER — TELEPHONE (OUTPATIENT)
Dept: ORTHOPEDICS | Facility: CLINIC | Age: 53
End: 2023-05-30
Payer: COMMERCIAL

## 2023-05-30 NOTE — TELEPHONE ENCOUNTER
Returned call. The patient's voice mail was not set up. I sent a message via [email or web address].    ----- Message from Jenny Rosado sent at 5/30/2023 10:55 AM CDT -----  Contact: Patient  Type:  Patient Needs Advice    Who Called:  Patient  What is this regarding?:  Pt needs her paperwork filled out so that she doesn't lose her job so that she can return to work on June 4th.  Best Call Back Number:  [phone]  Additional Information:  Please call the patient back at the phone number listed above to advise. Thank you!

## 2023-06-02 ENCOUNTER — DOCUMENTATION ONLY (OUTPATIENT)
Dept: ORTHOPEDICS | Facility: CLINIC | Age: 53
End: 2023-06-02
Payer: COMMERCIAL

## 2023-06-02 NOTE — PROGRESS NOTES
The patient's work accommodations medical qustionnaire was filled out, signed, and successfully faxed to [phone] on 5/26/23 at 2:09 P.M. A copy was left at the  in our [place] office.    Batched to medical records.

## 2023-09-25 DIAGNOSIS — M79.641 RIGHT HAND PAIN: Primary | ICD-10-CM

## 2023-12-19 DIAGNOSIS — M79.642 BILATERAL HAND PAIN: Primary | ICD-10-CM

## 2023-12-19 DIAGNOSIS — M79.641 BILATERAL HAND PAIN: Primary | ICD-10-CM

## 2024-02-09 ENCOUNTER — HOSPITAL ENCOUNTER (EMERGENCY)
Facility: HOSPITAL | Age: 54
Discharge: HOME | End: 2024-02-09
Attending: EMERGENCY MEDICINE
Payer: COMMERCIAL

## 2024-02-09 VITALS
RESPIRATION RATE: 20 BRPM | DIASTOLIC BLOOD PRESSURE: 97 MMHG | WEIGHT: 200 LBS | HEIGHT: 71 IN | SYSTOLIC BLOOD PRESSURE: 167 MMHG | HEART RATE: 89 BPM | BODY MASS INDEX: 28 KG/M2 | TEMPERATURE: 98 F | OXYGEN SATURATION: 100 %

## 2024-02-09 DIAGNOSIS — R52 PAIN: ICD-10-CM

## 2024-02-09 DIAGNOSIS — T14.8XXA MUSCULOSKELETAL STRAIN: Primary | ICD-10-CM

## 2024-02-09 PROCEDURE — 25000003 PHARM REV CODE 250: Performed by: NURSE PRACTITIONER

## 2024-02-09 PROCEDURE — 71046 X-RAY EXAM CHEST 2 VIEWS: CPT | Mod: TC

## 2024-02-09 PROCEDURE — 99284 EMERGENCY DEPT VISIT MOD MDM: CPT | Mod: 25

## 2024-02-09 PROCEDURE — 71046 X-RAY EXAM CHEST 2 VIEWS: CPT | Mod: 26,,, | Performed by: RADIOLOGY

## 2024-02-09 RX ORDER — CYCLOBENZAPRINE HCL 5 MG
10 TABLET ORAL
Status: COMPLETED | OUTPATIENT
Start: 2024-02-09 | End: 2024-02-09

## 2024-02-09 RX ORDER — CYCLOBENZAPRINE HCL 10 MG
10 TABLET ORAL 3 TIMES DAILY PRN
Qty: 30 TABLET | Refills: 2 | Status: SHIPPED | OUTPATIENT
Start: 2024-02-09

## 2024-02-09 RX ORDER — DICLOFENAC SODIUM 75 MG/1
75 TABLET, DELAYED RELEASE ORAL 2 TIMES DAILY PRN
Qty: 30 TABLET | Refills: 2 | Status: SHIPPED | OUTPATIENT
Start: 2024-02-09

## 2024-02-09 RX ADMIN — CYCLOBENZAPRINE HYDROCHLORIDE 10 MG: 5 TABLET, FILM COATED ORAL at 10:02
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
## (undated) DEVICE — UNDERGLOVES BIOGEL PI SZ 7 LF

## (undated) DEVICE — DRAPE HAND STERILE

## (undated) DEVICE — GLOVE GAMMEX SURG LF PI SZ 8

## (undated) DEVICE — BANDAGE ESMARK ELASTIC ST 4X9

## (undated) DEVICE — SUT ETHILON 4-0 PS2 18 BLK

## (undated) DEVICE — PAD SUREFIT GRND ELECTRD 10FT

## (undated) DEVICE — GLOVE SURG ULTRA TOUCH 9

## (undated) DEVICE — DRAPE STERI INSTRUMENT 1018

## (undated) DEVICE — DRAPE THREE-QUARTER 53X77IN

## (undated) DEVICE — SPONGE/BRUSH SCRUB SURG PCMX

## (undated) DEVICE — NDL ELECTRODE E-Z CLEAN 2.75IN

## (undated) DEVICE — Device

## (undated) DEVICE — SOL 9P NACL IRR PIC IL

## (undated) DEVICE — BANDAGE MATRIX HK LOOP 2IN 5YD

## (undated) DEVICE — DRESSING N ADH OIL EMUL 3X3

## (undated) DEVICE — SYR B-D REG DETCH 1INX21GA10ML

## (undated) DEVICE — GLOVE SURGEONS ULTRA TOUCH 6.5

## (undated) DEVICE — GAUZE SPONGE 4X4 12PLY

## (undated) DEVICE — DRAPE STERI U-SHAPED 47X51IN

## (undated) DEVICE — GLOVE SURG ULTRA TOUCH 7

## (undated) DEVICE — GOWN POLY REINF BRTH SLV LG

## (undated) DEVICE — BLADE SURG #15 CARBON STEEL

## (undated) DEVICE — SPONGE GAUZE 16PLY 4X4

## (undated) DEVICE — LABEL BLANK SURG 10 PER SHEET

## (undated) DEVICE — SPONGE LAP 18X18 PREWASHED

## (undated) DEVICE — GLOVE SURG ULTRA TOUCH 7.5

## (undated) DEVICE — DRAPE U STD FILM LG 60X84IN

## (undated) DEVICE — BANDAGE CONFORM 3IN STRL

## (undated) DEVICE — GLOVE SURG ULTRA TOUCH 8.5

## (undated) DEVICE — GLOVE SURG ULTRA TOUCH 8